# Patient Record
(demographics unavailable — no encounter records)

---

## 2024-11-20 NOTE — PLAN
[Findings (To Date)] : Findings from evaluation (to date) [Clinical Basis] : Clinical basis for current diagnosis and clinical impressions [Goals / Benefits] : Goals & potential benefits of treatment with medication, as well as the limitations of pharmacotherapy [CAM Therapies] : Benefits and limits of CAM therapies [Counseling] : Benefits and limits of counseling or therapy [504] : Entitlements under Section 504 of the Americans with Disabilities Act ("accommodation plans") [Family Questions] : Family's questions were addressed [Sleep] : The importance of sleep and strategies to ensure adequate sleep [FreeTextEntry3] :  Current recommendations: - Encouragement given about progress - Recommended CBT, stressed importance, think it will be much more effective now that Marco is able to be more communicative and is motivated to use strategies, referred to [facility] for resources - Discussed medication as an option for treatment but Marco was not interested - Discussed could explore accommodations for college  Previous recommendations: - Consider saffron 30 mg po daily since it may help with ADHD symptoms - Consider magnesium or l-theanine since they may help with anxiety/stress, recommended CALM magnesium - Discussed managing expectations with Marco's diagnoses - Explained to family about the additional stresses that Marco is under because of his diagnoses - Discussed being vigilant about SI, advised on seeking immediate medical attention if SI present, discussed crisis text with Marco - Discussed COLLEEN actions and things that need to be addressed (from 1,2,3 Magic Teen) - Discussed neurodiversity as a concept - Consider support groups and social skills through [employer]  - Consider 504 plan/accommodations for college boards and standardized tests, provided with form and letter, to f/u with the school psychologist - Recommended support groups through [employer]  - The Shyness and Social Anxiety Workbook for Teens: CBT and ACT Skills to Help You Build Social Confidence by Cassie Gutierrez - Discussed diagnosis of ASD with family - Discussed exploring autism advocacy resources, referred to Dr. Liang Johnson as a potential resource  Follow-up: - Call prn - F/U in Summer 2025   Billing: Level 5 E/M due to time (45 minutes),  because patient is being followed longitudinally for at least one chronic condition by a single provider, 96127 x 3 for rating scales [Rating Scales] : Clinical implications of rating scales [Resources] : Other available resources

## 2024-11-20 NOTE — HISTORY OF PRESENT ILLNESS
[FreeTextEntry5] : 12th grade [employer] Accommodations for standardized tests [FreeTextEntry1] : Marco is overall making a lot of progress, but continues to have some challenges.   Both Marco and his father say that things are better since the last visit.   Marco has a tough schedule with many AP classes. He is doing well with grades all in the 90's.   Marco is applying for colleges. He has already gotten into [employer]. His first choice is [employer]. He wants to study [employer]. His second choice is [employer].   Marco did get testing accommodations and used them for the SATs and AP English. He did find the extra time helpful. He got a 1460 on the SATs. He is not sure if he wants to apply for accommodations in college.   Marco is participating in school clubs. He is in the Yearbook class and says that the work was extremely stressful. 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 He was selected for the [employer] club.    Marco has significant ADHD symptoms based on the CADS. He says that his attention in school can vary depending on his mood and sleep the night before. His attention can be as low as 3-4/10 and up to 8-9/10. He can stay up late to do schoolwork and extra work for his clubs.   Marco used to get counseling but this has been discontinued. It was recommended at the last visit, but was not pursued. He can journal and finds that helpful sometimes. He says that he can often feel sad but that it is better than in the past. His anxiety has also decreased, but is still present. He denies any SI at this time (has had passive SI in the past).    Marco has made closer friends over time. He does feel supported.   Marco can have delayed sleep onset. He can sometimes stay up late to complete work. He can take melatonin QHS prn.   He continues with tics. They are not causing significant impairment at this time.  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

## 2024-11-20 NOTE — PHYSICAL EXAM
[Normal] : awake and interactive [de-identified] : mild facial acne [de-identified] : Engaged and talkative, did have a responsive smile, did laugh at times, less reserved than in the past, did answer questions appropriately, did show some insight, did speak with an odd prosody, bright affect

## 2024-11-20 NOTE — REVIEW OF SYSTEMS
[Normal] : Psychiatric [FreeTextEntry2] : no systemic symptoms [FreeTextEntry3] : passed vision screens [FreeTextEntry4] : passed hearing screens [FreeTextEntry7] : can have stomachaches in the morning [FreeTextEntry8] : has longstanding history of motor/vocal tics (throat clearing, shoulder shrugging, eyeblinking), can have staring spells but is responsive to touch, can struggle with sleep onset [de-identified] : sensitive skin, seems to have keratosis and eczema [de-identified] : history of borderline cholesterol

## 2024-11-20 NOTE — REASON FOR VISIT
[Follow-Up Visit] : a follow-up visit [FreeTextEntry2] : Marco is a 17-year-old boy with ASD and ADHD seen for a follow-up visit to monitor progress and discuss treatment recommendations. [FreeTextEntry4] : Melatonin QHS prn [FreeTextEntry1] : Marco ASHFORD [FreeTextEntry5] : Rating scales

## 2025-07-03 NOTE — PLAN
[Findings (To Date)] : Findings from evaluation (to date) [Clinical Basis] : Clinical basis for current diagnosis and clinical impressions [Rating Scales] : Clinical implications of rating scales [Goals / Benefits] : Goals & potential benefits of treatment with medication, as well as the limitations of pharmacotherapy [Counseling] : Benefits and limits of counseling or therapy [Resources] : Other available resources [504] : Entitlements under Section 504 of the Americans with Disabilities Act ("accommodation plans") [Family Questions] : Family's questions were addressed [Sleep] : The importance of sleep and strategies to ensure adequate sleep [FreeTextEntry3] : Current recommendations: - Encouragement given about progress - Recommended CBT, stressed importance, think it will be much more effective now that Marco is able to be more communicative and is motivated to use strategies, referred to [facility] for resources and to [facility] - Discussed medication as an option for treatment, discussed short-acting MPH as a possible option, provided with information to review, to call if trial desired - Discussed could explore accommodations for college, to reach out if desired - Outpatient Consent Form signed for communication with aunt and parents  Previous recommendations: - Consider saffron 30 mg po daily since it may help with ADHD symptoms - Consider magnesium or l-theanine since they may help with anxiety/stress, recommended CALM magnesium - Discussed managing expectations with Marco's diagnoses - Explained to family about the additional stresses that Marco is under because of his diagnoses - Discussed being vigilant about SI, advised on seeking immediate medical attention if SI present, discussed crisis text with Marco - Discussed COLLEEN actions and things that need to be addressed (from 1,2,3 [employer]) - Discussed neurodiversity as a concept - Consider support groups and social skills through [employer]org  - Consider 504 plan/accommodations for college boards and standardized tests, provided with form and letter, to f/u with the school psychologist - Recommended support groups through [employer]  - The Shyness and Social Anxiety Workbook for Teens: CBT and ACT Skills to Help You Build Social Confidence by Cassie Gutierrez - Discussed diagnosis of ASD with family - Discussed exploring autism advocacy resources, referred to Dr. Liang Johnson as a potential resource  Follow-up: - Call prn - F/U prn if medication trial or accommodations desired in college   Billing: Level 5 E/M due to time (45 minutes) [CAM Therapies] : Benefits and limits of CAM therapies

## 2025-07-03 NOTE — HISTORY OF PRESENT ILLNESS
[FreeTextEntry5] : Completed 12th grade [employer] Accommodations for standardized tests [FreeTextEntry1] : Marco is overall making a lot of progress.  Marco had a tough schedule this year with a lot of APs. He did well academically. His lowest grade was a 75-80 in AP physics.   Marco got into his first choice for college which was [employer]. He wants to study electrical engineering.   Marco did get testing accommodations and used them for the SATs and AP English. He did find the extra time helpful. He got a 1460 on the SATs. He does not think that he will need accommodations for college tests.   His father still notes challenges with time management. Marco says that he has used timers but will often ignore them.   Marco can occasionally take a daytime nap. He does take melatonin QHS prn for sleep onset. He denies any snoring.   Marco says that his attention span can vary. But does not think that any intervention is warranted.   Marco used to get counseling but this has been discontinued. It was recommended at the last visit, but was not pursued. He can journal and finds that helpful sometimes. He says that he can often feel sad but that it is better than in the past. His anxiety has also decreased, but is still present. He denies any SI at this time (has had passive SI in the past).    Marco has made closer friends over time. He does feel supported.   He continues with tics, but they are significantly decreased in frequency. They are not causing significant impairment at this time.  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

## 2025-07-03 NOTE — PLAN
[Findings (To Date)] : Findings from evaluation (to date) [Clinical Basis] : Clinical basis for current diagnosis and clinical impressions [Rating Scales] : Clinical implications of rating scales [Goals / Benefits] : Goals & potential benefits of treatment with medication, as well as the limitations of pharmacotherapy [Counseling] : Benefits and limits of counseling or therapy [Resources] : Other available resources [504] : Entitlements under Section 504 of the Americans with Disabilities Act ("accommodation plans") [Family Questions] : Family's questions were addressed [Sleep] : The importance of sleep and strategies to ensure adequate sleep [FreeTextEntry3] : Current recommendations: - Encouragement given about progress - Recommended CBT, stressed importance, think it will be much more effective now that Marco is able to be more communicative and is motivated to use strategies, referred to [facility] for resources and to [facility] - Discussed medication as an option for treatment, discussed short-acting MPH as a possible option, provided with information to review, to call if trial desired - Discussed could explore accommodations for college, to reach out if desired - Outpatient Consent Form signed for communication with aunt and parents  Previous recommendations: - Consider saffron 30 mg po daily since it may help with ADHD symptoms - Consider magnesium or l-theanine since they may help with anxiety/stress, recommended CALM magnesium - Discussed managing expectations with Marco's diagnoses - Explained to family about the additional stresses that Marco is under because of his diagnoses - Discussed being vigilant about SI, advised on seeking immediate medical attention if SI present, discussed crisis text with Marco - Discussed COLLEEN actions and things that need to be addressed (from 1,2,3 [employer]) - Discussed neurodiversity as a concept - Consider support groups and social skills through [employer]  - Consider 504 plan/accommodations for college boards and standardized tests, provided with form and letter, to f/u with the school psychologist - Recommended support groups through [employer]  - The Shyness and Social Anxiety Workbook for Teens: CBT and ACT Skills to Help You Build Social Confidence by Cassie Gutierrez - Discussed diagnosis of ASD with family - Discussed exploring autism advocacy resources, referred to Dr. Liang Johnson as a potential resource  Follow-up: - Call prn - F/U prn if medication trial or accommodations desired in college   Billing: Level 5 E/M due to time (45 minutes) [CAM Therapies] : Benefits and limits of CAM therapies

## 2025-07-03 NOTE — REVIEW OF SYSTEMS
[Normal] : Psychiatric [FreeTextEntry2] : no systemic symptoms [FreeTextEntry3] : passed vision screens [FreeTextEntry4] : passed hearing screens [FreeTextEntry7] : can have stomachaches in the morning [FreeTextEntry8] : has longstanding history of motor/vocal tics (throat clearing, shoulder shrugging, eyeblinking), can have staring spells but is responsive to touch, can struggle with sleep onset [de-identified] : sensitive skin, seems to have keratosis and eczema [de-identified] : history of borderline cholesterol

## 2025-07-03 NOTE — HISTORY OF PRESENT ILLNESS
[FreeTextEntry5] : Completed 12th grade [employer] Accommodations for standardized tests [FreeTextEntry1] : Marco is overall making a lot of progress.  Marco had a tough schedule this year with a lot of APs. He did well academically. His lowest grade was a 75-80 in AP physics.   Marco got into his first choice for college which was [employer]. He wants to study electrical engineering.   Marco did get testing accommodations and used them for the SATs and AP English. He did find the extra time helpful. He got a 1460 on the SATs. He does not think that he will need accommodations for college tests.   His father still notes challenges with time management. Marco says that he has used timers but will often ignore them.   Marco can occasionally take a daytime nap. He does take melatonin QHS prn for sleep onset. He denies any snoring.   Marco says that his attention span can vary. But does not think that any intervention is warranted.   Marco used to get counseling but this has been discontinued. It was recommended at the last visit, but was not pursued. He can journal and finds that helpful sometimes. He says that he can often feel sad but that it is better than in the past. His anxiety has also decreased, but is still present. He denies any SI at this time (has had passive SI in the past).    Marco has made closer friends over time. He does feel supported.   He continues with tics, but they are significantly decreased in frequency. They are not causing significant impairment at this time.  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

## 2025-07-03 NOTE — REVIEW OF SYSTEMS
[Normal] : Psychiatric [FreeTextEntry2] : no systemic symptoms [FreeTextEntry3] : passed vision screens [FreeTextEntry4] : passed hearing screens [FreeTextEntry7] : can have stomachaches in the morning [FreeTextEntry8] : has longstanding history of motor/vocal tics (throat clearing, shoulder shrugging, eyeblinking), can have staring spells but is responsive to touch, can struggle with sleep onset [de-identified] : sensitive skin, seems to have keratosis and eczema [de-identified] : history of borderline cholesterol

## 2025-07-03 NOTE — HISTORY OF PRESENT ILLNESS
[FreeTextEntry5] : Completed 12th grade [employer] Accommodations for standardized tests [FreeTextEntry1] : Marco is overall making a lot of progress.  Marco had a tough schedule this year with a lot of APs. He did well academically. His lowest grade was a 75-80 in AP physics.   Marco got into his first choice for college which was [employer]. He wants to study electrical engineering.   Marco did get testing accommodations and used them for the SATs and AP English. He did find the extra time helpful. He got a 1460 on the SATs. He does not think that he will need accommodations for college tests.   His father still notes challenges with time management. Marco says that he has used timers but will often ignore them.   Marco can occasionally take a daytime nap. He does take melatonin QHS prn for sleep onset. He denies any snoring.   Marco says that his attention span can vary. But does not think that any intervention is warranted.   Marco used to get counseling but this has been discontinued. It was recommended at the last visit, but was not pursued. He can journal and finds that helpful sometimes. He says that he can often feel sad but that it is better than in the past. His anxiety has also decreased, but is still present. He denies any SI at this time (has had passive SI in the past).    Marco has made closer friends over time. He does feel supported.   He continues with tics, but they are significantly decreased in frequency. They are not causing significant impairment at this time.  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

## 2025-07-03 NOTE — PHYSICAL EXAM
[Normal] : awake and interactive [de-identified] : mild facial acne [de-identified] : Engaged and talkative, did have a responsive smile, did laugh at times, less reserved than in the past, did answer questions appropriately, did show some insight, did speak with an odd prosody, bright affect

## 2025-07-03 NOTE — REASON FOR VISIT
[Follow-Up Visit] : a follow-up visit [FreeTextEntry2] : Marco is an 18-year-old boy with ASD and ADHD seen for a follow-up visit to monitor progress and discuss treatment recommendations. [FreeTextEntry4] : Melatonin QHS prn [FreeTextEntry1] : Marco ASHFORD [FreeTextEntry5] : medical records

## 2025-07-03 NOTE — PHYSICAL EXAM
[Normal] : awake and interactive [de-identified] : mild facial acne [de-identified] : Engaged and talkative, did have a responsive smile, did laugh at times, less reserved than in the past, did answer questions appropriately, did show some insight, did speak with an odd prosody, bright affect

## 2025-07-03 NOTE — REVIEW OF SYSTEMS
[Normal] : Psychiatric [FreeTextEntry2] : no systemic symptoms [FreeTextEntry3] : passed vision screens [FreeTextEntry4] : passed hearing screens [FreeTextEntry7] : can have stomachaches in the morning [FreeTextEntry8] : has longstanding history of motor/vocal tics (throat clearing, shoulder shrugging, eyeblinking), can have staring spells but is responsive to touch, can struggle with sleep onset [de-identified] : sensitive skin, seems to have keratosis and eczema [de-identified] : history of borderline cholesterol

## 2025-07-03 NOTE — PHYSICAL EXAM
[Normal] : awake and interactive [de-identified] : mild facial acne [de-identified] : Engaged and talkative, did have a responsive smile, did laugh at times, less reserved than in the past, did answer questions appropriately, did show some insight, did speak with an odd prosody, bright affect